# Patient Record
(demographics unavailable — no encounter records)

---

## 2025-01-31 NOTE — PHYSICAL EXAM
[Well Developed] : well developed [Well Nourished] : well nourished [No Acute Distress] : no acute distress [Normal Conjunctiva] : normal conjunctiva [5th Left ICS - MCL] : palpated at the 5th LICS in the midclavicular line [Rhythm Regular] : regular [Normal S1] : normal S1 [Normal S2] : normal S2 [Clear Lung Fields] : clear lung fields [Good Air Entry] : good air entry [No Respiratory Distress] : no respiratory distress  [Soft] : abdomen soft [Normal Gait] : normal gait [No Edema] : no edema [No Rash] : no rash [Moves all extremities] : moves all extremities [Alert and Oriented] : alert and oriented [Click] : no click [Normal Rate] : normal

## 2025-02-06 NOTE — HISTORY OF PRESENT ILLNESS
[FreeTextEntry1] : 46 year old female with history of seizures, PVCs with prior EP study/ablation at [facility] (unable to fully ablate given location near [facility] as per report), who presents for follow up.  She states as a child she had seizures that eventually just stopped.  She states she has regular palpitations that she describes as multiple single beats throughout the day.  These events impact her daily life significantly.  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  She states she was told they could not directly ablate the PVC given location / high risk for heart block.  She overall was doing well until about 5 months ago when she had surgery for endometriosis.  She saw a neurologist around then and was told she should go back on Depakote secondary to seizure activity on EEG though she did not go back on this after as she was put into chemical menopause after her surgery and felt like this was too much.  She states since her surgery her palpitations have increased significantly.  She notes extra beats that go into her neck and chest.  June 2023 she saw EP and was given Propranolol and went up to 30mg 3x a day but then her blood pressure went down so she stopped this.  She restarted at 5mg BID because of palpitations.  Ultimately switched to low dose flecainide. She did not notice a difference in the way she feels.   She continues to have intermittent palpitations.  No chest pain, syncope, orthopnea, PND or edema. Recent echo wtih EF 59%.  She states she may need HRT and needs clearance from our ofice.  2024 Event  monitor rates 52-  25% APCs, <1% PVCs.  21 beats SVT   event monitor 10/2023 for 9 days with HR 48- with 7 beats AT and 15.6% PACs - which correlate with symptoms may be junctional  and <1% PVCs   10 day event monitor 9/2021  HR 32-- short bursts of SVT - longest 11 beats. likely Wenckebach episodes   coronary CTa - no sig disease per her report  Transesophageal Echo as per her report - no PFO  as per her report echo in past with normal EF - at one time told maybe small mitral valve prolapse

## 2025-02-06 NOTE — DISCUSSION/SUMMARY
[EKG obtained to assist in diagnosis and management of assessed problem(s)] : EKG obtained to assist in diagnosis and management of assessed problem(s) [FreeTextEntry1] : 46 year old female with history of seizures, PVCs with prior EP study/ablation at [facility] (unable to fully ablate given location near [facility] as per report), who presents for follow up evaluation.  We reviewed her EP study in which they were unable to ablate her PVC secondary to location / high risk for heart block.  Recent event monitor with no PVCs but significant APCs that correlate with symptoms.   She is doing well on low dose flecainide.  She was reassured that APCs should not limit her daily activity and treatment is only for symptoms.  From an EP perspective she has no limitations whatsoever.  She can use HRT if indicated from an EP perspective.  Follow up in 6 months or sooner if needed.  She knows to call with any questions or concerns.

## 2025-02-06 NOTE — HISTORY OF PRESENT ILLNESS
[FreeTextEntry1] : 46 year old female with history of seizures, PVCs with prior EP study/ablation at [facility] (unable to fully ablate given location near [facility] as per report), who presents for follow up.  She states as a child she had seizures that eventually just stopped.  She states she has regular palpitations that she describes as multiple single beats throughout the day.  These events impact her daily life significantly.  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  She states she was told they could not directly ablate the PVC given location / high risk for heart block.  She overall was doing well until about 5 months ago when she had surgery for endometriosis.  She saw a neurologist around then and was told she should go back on Depakote secondary to seizure activity on EEG though she did not go back on this after as she was put into chemical menopause after her surgery and felt like this was too much.  She states since her surgery her palpitations have increased significantly.  She notes extra beats that go into her neck and chest.  June 2023 she saw EP and was given Propranolol and went up to 30mg 3x a day but then her blood pressure went down so she stopped this.  She restarted at 5mg BID because of palpitations.  Ultimately switched to low dose flecainide. She did not notice a difference in the way she feels.   She continues to have intermittent palpitations.  No chest pain, syncope, orthopnea, PND or edema. Recent echo wtih EF 59%.  She states she may need HRT and needs clearance from our ofice.  2024 Event  monitor rates 52-  25% APCs, <1% PVCs.  21 beats SVT   event monitor 10/2023 for 9 days with HR 48- with 7 beats AT and 15.6% PACs - which correlate with symptoms may be junctional  and <1% PVCs   10 day event monitor 9/2021  HR 32-- short bursts of SVT - longest 11 beats. likely Wenckebach episodes   coronary CTa - no sig disease per her report  Transesophageal Echo as per her report - no PFO  as per her report echo in past with normal EF - at one time told maybe small mitral valve prolapse

## 2025-02-06 NOTE — ADDENDUM
[FreeTextEntry1] : I, Shaheen Love, hereby attest that the medical record entry for this patient accurately reflects signatures/notations that I made on the Date of Service in my capacity as an Attending Physician when I treated/diagnosed the above patient. I do hereby attest that this information is true, accurate and complete to the best of my knowledge and I understand that any falsification, omission, or concealment of material fact may subject me to administrative, civil, or, criminal liability. I agree with the note as written by my PA in its entirety. I was present for the entire visit and supervised the entire visit and agree with the plan as outlined.  I, Michael Garnett, am scribing for and the presence of Dr. Love the following sections: HPI, PMH/Family/social history, ROS, Physical Exam, Assessment / Plan.

## 2025-02-06 NOTE — CARDIOLOGY SUMMARY
[de-identified] : 9/28/23 sinus at 80 with PACs 11/9/23 sinus with frequent PACs  6.6.24 NSR with APCs  1/29/25 Sinus at 68 with APCs

## 2025-02-06 NOTE — HISTORY OF PRESENT ILLNESS
[FreeTextEntry1] : 46 year old female with history of seizures, PVCs with prior EP study/ablation at [facility] (unable to fully ablate given location near [facility] as per report), who presents for follow up.  She states as a child she had seizures that eventually just stopped.  She states she has regular palpitations that she describes as multiple single beats throughout the day.  These events impact her daily life significantly.  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  She states she was told they could not directly ablate the PVC given location / high risk for heart block.  She overall was doing well until about 5 months ago when she had surgery for endometriosis.  She saw a neurologist around then and was told she should go back on Depakote secondary to seizure activity on EEG though she did not go back on this after as she was put into chemical menopause after her surgery and felt like this was too much.  She states since her surgery her palpitations have increased significantly.  She notes extra beats that go into her neck and chest.  June 2023 she saw EP and was given Propranolol and went up to 30mg 3x a day but then her blood pressure went down so she stopped this.  She restarted at 5mg BID because of palpitations.  Ultimately switched to low dose flecainide. She did not notice a difference in the way she feels.   She continues to have intermittent palpitations.  No chest pain, syncope, orthopnea, PND or edema. Recent echo wtih EF 59%.  She states she may need HRT and needs clearance from our ofice.  2024 Event  monitor rates 52-  25% APCs, <1% PVCs.  21 beats SVT   event monitor 10/2023 for 9 days with HR 48- with 7 beats AT and 15.6% PACs - which correlate with symptoms may be junctional  and <1% PVCs   10 day event monitor 9/2021  HR 32-- short bursts of SVT - longest 11 beats. likely Wenckebach episodes   coronary CTa - no sig disease per her report  Transesophageal Echo as per her report - no PFO  as per her report echo in past with normal EF - at one time told maybe small mitral valve prolapse

## 2025-02-06 NOTE — CARDIOLOGY SUMMARY
[de-identified] : 9/28/23 sinus at 80 with PACs 11/9/23 sinus with frequent PACs  6.6.24 NSR with APCs  1/29/25 Sinus at 68 with APCs

## 2025-02-06 NOTE — CARDIOLOGY SUMMARY
[de-identified] : 9/28/23 sinus at 80 with PACs 11/9/23 sinus with frequent PACs  6.6.24 NSR with APCs  1/29/25 Sinus at 68 with APCs

## 2025-02-06 NOTE — REVIEW OF SYSTEMS
[Palpitations] : palpitations [Negative] : Psychiatric [Fever] : no fever [Weight Gain (___ Lbs)] : no recent weight gain [Chills] : no chills [Feeling Fatigued] : not feeling fatigued [Weight Loss (___ Lbs)] : no recent weight loss [SOB] : no shortness of breath [Dyspnea on exertion] : not dyspnea during exertion [Chest Discomfort] : no chest discomfort [Lower Ext Edema] : no extremity edema [PND] : no PND [Syncope] : no syncope [Cough] : no cough [Under Stress] : not under stress